# Patient Record
Sex: MALE | Race: WHITE | NOT HISPANIC OR LATINO | Employment: OTHER | URBAN - METROPOLITAN AREA
[De-identification: names, ages, dates, MRNs, and addresses within clinical notes are randomized per-mention and may not be internally consistent; named-entity substitution may affect disease eponyms.]

---

## 2019-10-12 ENCOUNTER — HOSPITAL ENCOUNTER (EMERGENCY)
Facility: MEDICAL CENTER | Age: 69
End: 2019-10-12
Attending: EMERGENCY MEDICINE
Payer: MEDICARE

## 2019-10-12 ENCOUNTER — APPOINTMENT (OUTPATIENT)
Dept: RADIOLOGY | Facility: MEDICAL CENTER | Age: 69
End: 2019-10-12
Attending: EMERGENCY MEDICINE
Payer: MEDICARE

## 2019-10-12 VITALS
HEIGHT: 70 IN | BODY MASS INDEX: 25.66 KG/M2 | WEIGHT: 179.23 LBS | RESPIRATION RATE: 20 BRPM | SYSTOLIC BLOOD PRESSURE: 135 MMHG | OXYGEN SATURATION: 95 % | DIASTOLIC BLOOD PRESSURE: 94 MMHG | TEMPERATURE: 98.2 F | HEART RATE: 81 BPM

## 2019-10-12 DIAGNOSIS — R00.0 TACHYCARDIA: ICD-10-CM

## 2019-10-12 DIAGNOSIS — R00.2 PALPITATIONS: ICD-10-CM

## 2019-10-12 LAB
ALBUMIN SERPL BCP-MCNC: 4.2 G/DL (ref 3.2–4.9)
ALBUMIN/GLOB SERPL: 1.4 G/DL
ALP SERPL-CCNC: 72 U/L (ref 30–99)
ALT SERPL-CCNC: 44 U/L (ref 2–50)
ANION GAP SERPL CALC-SCNC: 9 MMOL/L (ref 0–11.9)
AST SERPL-CCNC: 31 U/L (ref 12–45)
BASOPHILS # BLD AUTO: 0.7 % (ref 0–1.8)
BASOPHILS # BLD: 0.04 K/UL (ref 0–0.12)
BILIRUB SERPL-MCNC: 0.5 MG/DL (ref 0.1–1.5)
BUN SERPL-MCNC: 21 MG/DL (ref 8–22)
CALCIUM SERPL-MCNC: 9.2 MG/DL (ref 8.5–10.5)
CHLORIDE SERPL-SCNC: 106 MMOL/L (ref 96–112)
CO2 SERPL-SCNC: 22 MMOL/L (ref 20–33)
CREAT SERPL-MCNC: 1 MG/DL (ref 0.5–1.4)
EKG IMPRESSION: NORMAL
EOSINOPHIL # BLD AUTO: 0.09 K/UL (ref 0–0.51)
EOSINOPHIL NFR BLD: 1.5 % (ref 0–6.9)
ERYTHROCYTE [DISTWIDTH] IN BLOOD BY AUTOMATED COUNT: 41 FL (ref 35.9–50)
GLOBULIN SER CALC-MCNC: 2.9 G/DL (ref 1.9–3.5)
GLUCOSE SERPL-MCNC: 103 MG/DL (ref 65–99)
HCT VFR BLD AUTO: 47.8 % (ref 42–52)
HGB BLD-MCNC: 16 G/DL (ref 14–18)
IMM GRANULOCYTES # BLD AUTO: 0.02 K/UL (ref 0–0.11)
IMM GRANULOCYTES NFR BLD AUTO: 0.3 % (ref 0–0.9)
LYMPHOCYTES # BLD AUTO: 1.5 K/UL (ref 1–4.8)
LYMPHOCYTES NFR BLD: 25.3 % (ref 22–41)
MCH RBC QN AUTO: 30.9 PG (ref 27–33)
MCHC RBC AUTO-ENTMCNC: 33.5 G/DL (ref 33.7–35.3)
MCV RBC AUTO: 92.5 FL (ref 81.4–97.8)
MONOCYTES # BLD AUTO: 0.55 K/UL (ref 0–0.85)
MONOCYTES NFR BLD AUTO: 9.3 % (ref 0–13.4)
NEUTROPHILS # BLD AUTO: 3.74 K/UL (ref 1.82–7.42)
NEUTROPHILS NFR BLD: 62.9 % (ref 44–72)
NRBC # BLD AUTO: 0 K/UL
NRBC BLD-RTO: 0 /100 WBC
PLATELET # BLD AUTO: 176 K/UL (ref 164–446)
PMV BLD AUTO: 10 FL (ref 9–12.9)
POTASSIUM SERPL-SCNC: 4.4 MMOL/L (ref 3.6–5.5)
PROT SERPL-MCNC: 7.1 G/DL (ref 6–8.2)
RBC # BLD AUTO: 5.17 M/UL (ref 4.7–6.1)
SODIUM SERPL-SCNC: 137 MMOL/L (ref 135–145)
TROPONIN T SERPL-MCNC: 9 NG/L (ref 6–19)
WBC # BLD AUTO: 5.9 K/UL (ref 4.8–10.8)

## 2019-10-12 PROCEDURE — 93005 ELECTROCARDIOGRAM TRACING: CPT | Performed by: EMERGENCY MEDICINE

## 2019-10-12 PROCEDURE — 80053 COMPREHEN METABOLIC PANEL: CPT

## 2019-10-12 PROCEDURE — 36415 COLL VENOUS BLD VENIPUNCTURE: CPT

## 2019-10-12 PROCEDURE — 71045 X-RAY EXAM CHEST 1 VIEW: CPT

## 2019-10-12 PROCEDURE — 99284 EMERGENCY DEPT VISIT MOD MDM: CPT

## 2019-10-12 PROCEDURE — 93005 ELECTROCARDIOGRAM TRACING: CPT

## 2019-10-12 PROCEDURE — 84484 ASSAY OF TROPONIN QUANT: CPT

## 2019-10-12 PROCEDURE — 85025 COMPLETE CBC W/AUTO DIFF WBC: CPT

## 2019-10-12 RX ORDER — METOPROLOL SUCCINATE 50 MG/1
50 TABLET, EXTENDED RELEASE ORAL DAILY
Qty: 30 TAB | Refills: 0 | Status: SHIPPED | OUTPATIENT
Start: 2019-10-12

## 2019-10-12 SDOH — HEALTH STABILITY: MENTAL HEALTH: HOW OFTEN DO YOU HAVE A DRINK CONTAINING ALCOHOL?: NEVER

## 2019-10-12 NOTE — ED PROVIDER NOTES
"ED Provider Note    CHIEF COMPLAINT  Chief Complaint   Patient presents with   • Follow-Up     s/p ablation on 9/3 d/t afib. here for follow-up and recheck. was told that everything was fine after the ablation but last few days heart rate went up between    • Irregular Heart Beat     x 5 days. states he felt \"bubbling\" feeling in his chest. denies sob/dizziness/chest pain       HPI  Sim Sullivan is a 68 y.o. male with a history of hypercholesterolemia, atrial fibrillation, status post pacemaker placement, status post ablation in September of this year, on chronic anticoagulation with Eliquis who presents with complaints of a elevated and racing heart rate for the past 2 days.  Patient says that he is visiting and [place], noticed that his heart rate was progressively becoming higher.  He says normally his heart rate in the 60s.  After the ablation, he he continued on his medications including metoprolol.  After 30 days, the metoprolol was discontinued on Tuesday 4 days ago.  The patient denies any lightheadedness, dizziness, chest pain, shortness of breath, or abdominal pain.  He has had no nausea, vomiting, or diaphoresis.    REVIEW OF SYSTEMS  See HPI for further details. All other systems are negative.     PAST MEDICAL HISTORY  Past Medical History:   Diagnosis Date   • High cholesterol        FAMILY HISTORY  History reviewed. No pertinent family history.    SOCIAL HISTORY  Social History     Socioeconomic History   • Marital status: Not on file     Spouse name: Not on file   • Number of children: Not on file   • Years of education: Not on file   • Highest education level: Not on file   Occupational History   • Not on file   Social Needs   • Financial resource strain: Not on file   • Food insecurity:     Worry: Not on file     Inability: Not on file   • Transportation needs:     Medical: Not on file     Non-medical: Not on file   Tobacco Use   • Smoking status: Never Smoker   • Smokeless tobacco: Never Used " "  Substance and Sexual Activity   • Alcohol use: Never     Frequency: Never   • Drug use: Never   • Sexual activity: Not on file   Lifestyle   • Physical activity:     Days per week: Not on file     Minutes per session: Not on file   • Stress: Not on file   Relationships   • Social connections:     Talks on phone: Not on file     Gets together: Not on file     Attends Jainism service: Not on file     Active member of club or organization: Not on file     Attends meetings of clubs or organizations: Not on file     Relationship status: Not on file   • Intimate partner violence:     Fear of current or ex partner: Not on file     Emotionally abused: Not on file     Physically abused: Not on file     Forced sexual activity: Not on file   Other Topics Concern   • Not on file   Social History Narrative   • Not on file       SURGICAL HISTORY  Past Surgical History:   Procedure Laterality Date   • OTHER CARDIAC SURGERY      Ablation 9/3/2019       CURRENT MEDICATIONS  Home Medications     Reviewed by Piedad Paniagua R.N. (Registered Nurse) on 10/12/19 at 0905  Med List Status: Partial    Medication Last Dose Status    Apixaban (ELIQUIS PO) taking Active    METOPROLOL TARTRATE PO taking Active                ALLERGIES  Allergies   Allergen Reactions   • Morphine      \"hallucination\"       PHYSICAL EXAM  VITAL SIGNS: Blood Pressure 127/87   Pulse 80   Temperature 36.8 °C (98.2 °F) (Temporal)   Respiration (Abnormal) 22   Height 1.778 m (5' 10\")   Weight 81.3 kg (179 lb 3.7 oz)   Oxygen Saturation 92%   Body Mass Index 25.72 kg/m²   Constitutional: Awake, alert, in no acute distress, Non-toxic appearance.   HENT: Atraumatic. Bilateral external ears normal, mucous membranes moist, throat nonerythematous without exudates, nose is normal.  Eyes: PERRL, EOMI, conjunctiva moist, noninjected.  Neck: Nontender, Normal range of motion, No nuchal rigidity, No stridor.   Lymphatic: No lymphadenopathy noted.   Cardiovascular: " Regular rate and rhythm, no murmurs, rubs, gallops.  Thorax & Lungs:  Good breath sounds bilaterally, no wheezes, rales, or retractions.  No chest tenderness.  Abdomen: Bowel sounds normal, Soft, nontender, nondistended, no rebound, guarding, masses.  Back: No CVA or spinal tenderness.  Extremities: Intact distal pulses, No edema, No tenderness.   Skin: Warm, Dry, No rashes.   Musculoskeletal: No joint swelling or tenderness.  Neurologic: Alert & oriented x 3, sensory and motor function normal. No focal deficits.   Psychiatric: Affect normal, Judgment normal, Mood normal.     EKG  Twelve-lead EKG shows a normal sinus rhythm, rate of 89, first-degree AV block, MD interval 2 4 4 ms, QTc interval normal, normal axis, no acute ST wave elevations or ST depressions, no pathologic Q waves, no evidence of acute injury or ischemic pattern on my reading, there is no previous EKG for comparison.        RADIOLOGY/PROCEDURES  DX-CHEST-PORTABLE (1 VIEW)   Final Result         No acute cardiac or pulmonary abnormality is identified.            COURSE & MEDICAL DECISION MAKING  Pertinent Labs & Imaging studies reviewed. (See chart for details)  The patient presents with above complaints.  On presentation he appears to be in a normal sinus rhythm with a rate in the 80s.  He does not have any chest pain, but did complain of having a rapid heart rate and palpitations yesterday.  EKG shows a normal sinus rhythm.  Chest x-ray shows no acute cardiopulmonary abnormalities.  CBC shows white count 5900, hemoglobin 16, normal differential, chemistry shows a glucose of 103, otherwise normal.  Troponin 9.  The patient's pacemaker was interrogated and did show one episode of a atrial tachycardia with a rate in the 140s 150s yesterday.  He did not appear to have any other significant arrhythmias per the technician.  I spoke with the on call nurse practioner for the patient's electrophysiologist Dr. GWYN Parry, and patient will be restarted  on his metoprolol 50 mg daily.  He is told to call the cardiologist for any further problems with his heart rate.  The patient is given a copy of his EKG, pacemaker interrogation, and lab work.  He is to return to the ER for any further problems including chest pain, shortness of breath, dizziness, rapid heart rate, or any other problems.    FINAL IMPRESSION  1.  Atrial Tachycardia  2.  Palpitations  3.         Electronically signed by: Quan Rodriguez, 10/12/2019 10:09 AM

## 2019-10-12 NOTE — ED TRIAGE NOTES
"Chief Complaint   Patient presents with   • Follow-Up     s/p ablation on 9/3 d/t afib. here for follow-up and recheck. was told that everything was fine after the ablation but last few days heart rate went up between    • Irregular Heart Beat     x 5 days. states he felt \"bubbling\" feeling in his chest. denies sob/dizziness/chest pain     Skin pwd. NAD. Educated on triage process. Instructed to notify staff for any changes. Placed in senior lounge room.  "